# Patient Record
Sex: FEMALE | Race: WHITE | NOT HISPANIC OR LATINO | Employment: UNEMPLOYED | URBAN - METROPOLITAN AREA
[De-identification: names, ages, dates, MRNs, and addresses within clinical notes are randomized per-mention and may not be internally consistent; named-entity substitution may affect disease eponyms.]

---

## 2022-10-31 ENCOUNTER — OFFICE VISIT (OUTPATIENT)
Dept: INTERNAL MEDICINE | Facility: CLINIC | Age: 22
End: 2022-10-31
Payer: MEDICAID

## 2022-10-31 VITALS
HEART RATE: 97 BPM | DIASTOLIC BLOOD PRESSURE: 72 MMHG | HEIGHT: 63 IN | RESPIRATION RATE: 20 BRPM | TEMPERATURE: 99 F | BODY MASS INDEX: 29.41 KG/M2 | OXYGEN SATURATION: 97 % | WEIGHT: 166 LBS | SYSTOLIC BLOOD PRESSURE: 126 MMHG

## 2022-10-31 DIAGNOSIS — J02.9 SORE THROAT: Primary | ICD-10-CM

## 2022-10-31 DIAGNOSIS — M41.9 SCOLIOSIS, UNSPECIFIED SCOLIOSIS TYPE, UNSPECIFIED SPINAL REGION: ICD-10-CM

## 2022-10-31 DIAGNOSIS — Z12.4 CERVICAL CANCER SCREENING: ICD-10-CM

## 2022-10-31 DIAGNOSIS — Z00.00 WELLNESS EXAMINATION: ICD-10-CM

## 2022-10-31 PROCEDURE — 3008F BODY MASS INDEX DOCD: CPT | Mod: CPTII,,, | Performed by: STUDENT IN AN ORGANIZED HEALTH CARE EDUCATION/TRAINING PROGRAM

## 2022-10-31 PROCEDURE — 1159F MED LIST DOCD IN RCRD: CPT | Mod: CPTII,,, | Performed by: STUDENT IN AN ORGANIZED HEALTH CARE EDUCATION/TRAINING PROGRAM

## 2022-10-31 PROCEDURE — 1159F PR MEDICATION LIST DOCUMENTED IN MEDICAL RECORD: ICD-10-PCS | Mod: CPTII,,, | Performed by: STUDENT IN AN ORGANIZED HEALTH CARE EDUCATION/TRAINING PROGRAM

## 2022-10-31 PROCEDURE — 1160F RVW MEDS BY RX/DR IN RCRD: CPT | Mod: CPTII,,, | Performed by: STUDENT IN AN ORGANIZED HEALTH CARE EDUCATION/TRAINING PROGRAM

## 2022-10-31 PROCEDURE — 99204 OFFICE O/P NEW MOD 45 MIN: CPT | Mod: ,,, | Performed by: STUDENT IN AN ORGANIZED HEALTH CARE EDUCATION/TRAINING PROGRAM

## 2022-10-31 PROCEDURE — 1160F PR REVIEW ALL MEDS BY PRESCRIBER/CLIN PHARMACIST DOCUMENTED: ICD-10-PCS | Mod: CPTII,,, | Performed by: STUDENT IN AN ORGANIZED HEALTH CARE EDUCATION/TRAINING PROGRAM

## 2022-10-31 PROCEDURE — 3078F PR MOST RECENT DIASTOLIC BLOOD PRESSURE < 80 MM HG: ICD-10-PCS | Mod: CPTII,,, | Performed by: STUDENT IN AN ORGANIZED HEALTH CARE EDUCATION/TRAINING PROGRAM

## 2022-10-31 PROCEDURE — 3074F SYST BP LT 130 MM HG: CPT | Mod: CPTII,,, | Performed by: STUDENT IN AN ORGANIZED HEALTH CARE EDUCATION/TRAINING PROGRAM

## 2022-10-31 PROCEDURE — 3008F PR BODY MASS INDEX (BMI) DOCUMENTED: ICD-10-PCS | Mod: CPTII,,, | Performed by: STUDENT IN AN ORGANIZED HEALTH CARE EDUCATION/TRAINING PROGRAM

## 2022-10-31 PROCEDURE — 3078F DIAST BP <80 MM HG: CPT | Mod: CPTII,,, | Performed by: STUDENT IN AN ORGANIZED HEALTH CARE EDUCATION/TRAINING PROGRAM

## 2022-10-31 PROCEDURE — 3074F PR MOST RECENT SYSTOLIC BLOOD PRESSURE < 130 MM HG: ICD-10-PCS | Mod: CPTII,,, | Performed by: STUDENT IN AN ORGANIZED HEALTH CARE EDUCATION/TRAINING PROGRAM

## 2022-10-31 PROCEDURE — 99204 PR OFFICE/OUTPT VISIT, NEW, LEVL IV, 45-59 MIN: ICD-10-PCS | Mod: ,,, | Performed by: STUDENT IN AN ORGANIZED HEALTH CARE EDUCATION/TRAINING PROGRAM

## 2022-11-01 PROBLEM — J02.9 SORE THROAT: Status: ACTIVE | Noted: 2022-11-01

## 2022-11-01 PROBLEM — Z00.00 WELLNESS EXAMINATION: Status: ACTIVE | Noted: 2022-11-01

## 2022-11-01 NOTE — PROGRESS NOTES
Subjective:      Dalia Meade  11/01/2022  [id number]      Chief Complaint: Establish Care (Pt presents to clinic to establish care )       HPI:  Ms Gómez is a 21 y/o female patient who is here to establish care. Patient has no significant past medical history. Patient is complaining of sore throat, myalgias and headache that started on Friday. Patient has had sick contacts with flu. No other complaints.     Past Medical History:   Diagnosis Date    Anxiety disorder, unspecified     Scoliosis      Past Surgical History:   Procedure Laterality Date    BACK SURGERY       Family History   Problem Relation Age of Onset    Cancer Mother      Social History     Tobacco Use    Smoking status: Every Day     Types: Cigarettes, Vaping with nicotine     Passive exposure: Never    Smokeless tobacco: Never   Substance and Sexual Activity    Alcohol use: Never    Drug use: Never    Sexual activity: Never     Review of patient's allergies indicates:  No Known Allergies    The following were reviewed at this visit: active problem list, medication list, allergies, family history, social history, and health maintenance.    Medications:  No current outpatient medications on file.      Medications have been reviewed and reconciled with patient at this visit.  Barriers to medications reviewed with patient.    Adverse reactions to current medications reviewed with patient..    Over the counter medications reviewed and reconciled with patient.  Review of Systems   Constitutional:  Negative for chills, diaphoresis, fever and weight loss.   HENT:  Positive for sore throat. Negative for congestion, ear discharge and sinus pain.    Eyes:  Negative for photophobia.   Respiratory:  Negative for cough, hemoptysis and shortness of breath.    Cardiovascular:  Negative for chest pain, palpitations, claudication, leg swelling and PND.   Gastrointestinal:  Negative for constipation, diarrhea, nausea and vomiting.   Genitourinary:  Negative for  "dysuria, frequency and urgency.   Musculoskeletal:  Negative for back pain, joint pain, myalgias and neck pain.   Skin:  Negative for itching and rash.   Neurological:  Negative for dizziness, focal weakness and headaches.   Psychiatric/Behavioral:  Negative for depression. The patient does not have insomnia.          Objective:      Vitals:    10/31/22 1333   BP: 126/72   BP Location: Left arm   Patient Position: Sitting   BP Method: Medium (Automatic)   Pulse: 97   Resp: 20   Temp: 98.7 °F (37.1 °C)   TempSrc: Oral   SpO2: 97%   Weight: 75.3 kg (166 lb)   Height: 5' 3" (1.6 m)       Physical Exam  Constitutional:       General: She is not in acute distress.     Appearance: Normal appearance.   HENT:      Head: Normocephalic and atraumatic.      Mouth/Throat:      Pharynx: Posterior oropharyngeal erythema present.      Tonsils: Tonsillar exudate present.   Eyes:      Extraocular Movements: Extraocular movements intact.      Pupils: Pupils are equal, round, and reactive to light.   Cardiovascular:      Rate and Rhythm: Normal rate and regular rhythm.      Pulses: Normal pulses.      Heart sounds: Normal heart sounds. No murmur heard.    No friction rub. No gallop.   Pulmonary:      Effort: Pulmonary effort is normal.      Breath sounds: Normal breath sounds. No wheezing, rhonchi or rales.   Abdominal:      General: Abdomen is flat. Bowel sounds are normal. There is no distension.      Palpations: Abdomen is soft.      Tenderness: There is no abdominal tenderness.   Musculoskeletal:         General: No swelling or tenderness. Normal range of motion.      Cervical back: Normal range of motion and neck supple. No tenderness.      Right lower leg: No edema.      Left lower leg: No edema.   Lymphadenopathy:      Cervical: No cervical adenopathy.   Skin:     Findings: No lesion or rash.   Neurological:      General: No focal deficit present.      Mental Status: She is alert and oriented to person, place, and time.      " Cranial Nerves: No cranial nerve deficit.      Sensory: No sensory deficit.      Motor: No weakness.   Psychiatric:         Mood and Affect: Mood normal.         Behavior: Behavior normal.         Thought Content: Thought content normal.             Assessment and Plan:       1. Sore throat  Assessment & Plan:  Exudates present on physical exam  Rapid strep negative in clinic  Advised patient to get tested for flu at walk in clinic or pharmacy, these are not available in office       2. Wellness examination  Assessment & Plan:  Pap smear: due, will refer to GYN  Labs: due, ordered today      Orders:  -     CBC Auto Differential; Future; Expected date: 10/31/2022  -     Comprehensive Metabolic Panel; Future; Expected date: 10/31/2022  -     Lipid Panel; Future; Expected date: 10/31/2022    3. Cervical cancer screening  -     Ambulatory referral/consult to Gynecology; Future; Expected date: 11/07/2022    4. Scoliosis, unspecified scoliosis type, unspecified spinal region  -     Ambulatory referral/consult to Orthopedics; Future; Expected date: 11/07/2022          Follow up: Follow up in about 1 year (around 10/31/2023) for wellness, Labs check.

## 2022-11-01 NOTE — ASSESSMENT & PLAN NOTE
Exudates present on physical exam  Rapid strep negative in clinic  Advised patient to get tested for flu at walk in clinic or pharmacy, these are not available in office

## 2023-02-06 PROBLEM — Z00.00 WELLNESS EXAMINATION: Status: RESOLVED | Noted: 2022-11-01 | Resolved: 2023-02-06

## 2023-05-11 ENCOUNTER — TELEPHONE (OUTPATIENT)
Dept: INTERNAL MEDICINE | Facility: CLINIC | Age: 23
End: 2023-05-11
Payer: MEDICAID

## 2023-05-11 NOTE — TELEPHONE ENCOUNTER
Spoke with the pt, inform the pt that lab work can be done when she come in for her wellness. Pt has verbalized understanding.

## 2023-05-11 NOTE — TELEPHONE ENCOUNTER
----- Message from Cammy Victoria sent at 5/11/2023  1:42 PM CDT -----      .Type:  Patient Returning Call    Who Called:pt   Who Left Message for Patient:Shashank   Does the patient know what this is regarding?:message   Would the patient rather a call back or a response via [facility]? Call back   Best Call Back Number:[phone]  Additional Information: returning call please call back

## 2023-05-11 NOTE — TELEPHONE ENCOUNTER
----- Message from Cammy Victoria sent at 5/11/2023 11:31 AM CDT -----  .Type:  Needs Medical Advice    Who Called: pt     Would the patient rather a call back or a response via [facility]? Call back   Best Call Back Number: [phone]  Additional Information: pt is requesting lab orders

## 2023-05-11 NOTE — TELEPHONE ENCOUNTER
Was unsuccessful with reaching the pt, but I was abke to leave an vm for pt to return call to the office.

## 2023-06-24 ENCOUNTER — OFFICE VISIT (OUTPATIENT)
Dept: URGENT CARE | Facility: CLINIC | Age: 23
End: 2023-06-24
Payer: MEDICAID

## 2023-06-24 VITALS
OXYGEN SATURATION: 100 % | BODY MASS INDEX: 25.88 KG/M2 | DIASTOLIC BLOOD PRESSURE: 86 MMHG | HEART RATE: 84 BPM | RESPIRATION RATE: 22 BRPM | SYSTOLIC BLOOD PRESSURE: 126 MMHG | TEMPERATURE: 99 F | WEIGHT: 146.06 LBS | HEIGHT: 63 IN

## 2023-06-24 DIAGNOSIS — J02.0 STREP PHARYNGITIS: Primary | ICD-10-CM

## 2023-06-24 DIAGNOSIS — Z76.89 REFERRAL OF PATIENT: ICD-10-CM

## 2023-06-24 DIAGNOSIS — N30.90 CYSTITIS: ICD-10-CM

## 2023-06-24 DIAGNOSIS — F41.9 ANXIETY: ICD-10-CM

## 2023-06-24 LAB
BILIRUB UR QL STRIP: NEGATIVE
CTP QC/QA: YES
CTP QC/QA: YES
GLUCOSE UR QL STRIP: NEGATIVE
KETONES UR QL STRIP: POSITIVE
LEUKOCYTE ESTERASE UR QL STRIP: POSITIVE
MOLECULAR STREP A: POSITIVE
PH, POC UA: 5.5
POC BLOOD, URINE: POSITIVE
POC NITRATES, URINE: NEGATIVE
PROT UR QL STRIP: POSITIVE
SARS-COV-2 RDRP RESP QL NAA+PROBE: NEGATIVE
SP GR UR STRIP: >=1.03 (ref 1–1.03)
UROBILINOGEN UR STRIP-ACNC: 0.2 (ref 0.1–1.1)

## 2023-06-24 PROCEDURE — 99214 OFFICE O/P EST MOD 30 MIN: CPT | Mod: S$PBB,,,

## 2023-06-24 PROCEDURE — 99214 PR OFFICE/OUTPT VISIT, EST, LEVL IV, 30-39 MIN: ICD-10-PCS | Mod: S$PBB,,,

## 2023-06-24 PROCEDURE — 87088 URINE BACTERIA CULTURE: CPT

## 2023-06-24 PROCEDURE — 81003 URINALYSIS AUTO W/O SCOPE: CPT | Mod: PBBFAC

## 2023-06-24 PROCEDURE — 87651 STREP A DNA AMP PROBE: CPT | Mod: PBBFAC

## 2023-06-24 PROCEDURE — 99215 OFFICE O/P EST HI 40 MIN: CPT | Mod: PBBFAC

## 2023-06-24 PROCEDURE — 87635 SARS-COV-2 COVID-19 AMP PRB: CPT | Mod: PBBFAC

## 2023-06-24 RX ORDER — CEPHALEXIN 500 MG/1
500 CAPSULE ORAL 2 TIMES DAILY
Qty: 20 CAPSULE | Refills: 0 | Status: SHIPPED | OUTPATIENT
Start: 2023-06-24 | End: 2023-07-04

## 2023-06-24 RX ORDER — HYDROXYZINE HYDROCHLORIDE 50 MG/1
50 TABLET, FILM COATED ORAL 3 TIMES DAILY PRN
Qty: 21 TABLET | Refills: 0 | Status: SHIPPED | OUTPATIENT
Start: 2023-06-24 | End: 2023-07-01

## 2023-06-24 NOTE — PROGRESS NOTES
"Subjective:      Patient ID: Dalia Meade is a 22 y.o. female.    Vitals:  height is 5' 3" (1.6 m) and weight is 66.3 kg (146 lb 0.9 oz). Her oral temperature is 98.8 °F (37.1 °C). Her blood pressure is 126/86 and her pulse is 84. Her respiration is 22 (abnormal) and oxygen saturation is 100%.     Chief Complaint: Abdominal Pain, Headache, and Chest Heaviness (Symptoms started on yesterday. )    Cc as above. States she is stress due to work and school, so she does vape a lot. She was near her nephew and mom who are both sick.     Abdominal Pain  This is a new problem. The current episode started yesterday. The onset quality is sudden. The problem occurs intermittently. The problem has been unchanged. The pain is located in the periumbilical region. The pain is mild. The quality of the pain is cramping. The abdominal pain does not radiate. Associated symptoms include diarrhea and headaches. Pertinent negatives include no fever, nausea or vomiting. She has tried nothing for the symptoms.   Headache   This is a new problem. The current episode started yesterday. The problem occurs intermittently. The problem has been unchanged. The pain is located in the Frontal region. The pain radiates to the face. The quality of the pain is described as aching. The pain is mild. Associated symptoms include abdominal pain. Pertinent negatives include no coughing, fever, nausea, rhinorrhea, sore throat or vomiting. She has tried nothing for the symptoms.     Constitution: Negative for fever.   HENT:  Negative for sore throat.    Respiratory:  Negative for cough.    Gastrointestinal:  Positive for abdominal pain and diarrhea. Negative for nausea and vomiting.   Neurological:  Positive for headaches.    Objective:     Physical Exam   Constitutional: She is oriented to person, place, and time. She appears well-developed. She is cooperative.  Non-toxic appearance. normal  HENT:   Head: Normocephalic and atraumatic.   Ears:   Right Ear: " External ear and ear canal normal. A middle ear effusion is present.   Left Ear: External ear and ear canal normal. A middle ear effusion is present.   Nose: Nose normal. No rhinorrhea or congestion.      Comments: Mild nasal turbates  Mouth/Throat: Mucous membranes are normal. Mucous membranes are moist. Posterior oropharyngeal erythema present. Oropharynx is clear.      Comments: Mild erythema  Mild nasal turbates      Comments: Mild nasal turbates  Eyes: Conjunctivae and lids are normal. periorbital hyperpigmentation   Neck: Trachea normal. Neck supple.   Cardiovascular: Normal rate, regular rhythm, normal heart sounds and normal pulses.   Pulmonary/Chest: Effort normal and breath sounds normal. No respiratory distress.   Abdominal: Normal appearance and bowel sounds are normal. Soft. There is abdominal tenderness in the periumbilical area.   Musculoskeletal: Normal range of motion.         General: Normal range of motion.   Lymphadenopathy:     She has cervical adenopathy.   Neurological: no focal deficit. She is alert and oriented to person, place, and time. She has normal strength.   Skin: Skin is warm, dry, intact, not diaphoretic and not pale.   Psychiatric: Her speech is normal and behavior is normal. Mood, judgment and thought content normal.   Nursing note and vitals reviewed.    Assessment:     1. Strep pharyngitis    2. Cystitis    3. Anxiety    4. Referral of patient      Results for orders placed or performed in visit on 06/24/23   POCT COVID-19 Rapid Screening   Result Value Ref Range    POC Rapid COVID Negative Negative     Acceptable Yes    POCT Urinalysis, Dipstick, Automated, W/O Scope   Result Value Ref Range    POC Blood, Urine Positive (A) Negative    POC Bilirubin, Urine Negative Negative    POC Urobilinogen, Urine 0.2 0.1 - 1.1    POC Ketones, Urine Positive (A) Negative    POC Protein, Urine Positive (A) Negative    POC Nitrates, Urine Negative Negative    POC Glucose, Urine  Negative Negative    pH, UA 5.5     POC Specific Gravity, Urine >=1.030 1.003 - 1.029    POC Leukocytes, Urine Positive (A) Negative   POCT Strep A, Molecular   Result Value Ref Range    Molecular Strep A, POC Positive (A) Negative     Acceptable Yes         Plan:       Strep pharyngitis  -     POCT COVID-19 Rapid Screening  -     POCT Urinalysis, Dipstick, Automated, W/O Scope  -     POCT Strep A, Molecular  -     cephALEXin (KEFLEX) 500 MG capsule; Take 1 capsule (500 mg total) by mouth 2 (two) times a day. for 10 days  Dispense: 20 capsule; Refill: 0    Cystitis  -     POCT Urinalysis, Dipstick, Automated, W/O Scope  -     Urine culture  -     cephALEXin (KEFLEX) 500 MG capsule; Take 1 capsule (500 mg total) by mouth 2 (two) times a day. for 10 days  Dispense: 20 capsule; Refill: 0    Anxiety  -     hydrOXYzine (ATARAX) 50 MG tablet; Take 1 tablet (50 mg total) by mouth 3 (three) times daily as needed for Anxiety.  Dispense: 21 tablet; Refill: 0    Referral of patient  -     Ambulatory referral/consult to Psychiatry           Rest. Drink plenty of fluids. Take antibiotics as ordered. May take tylenol or ibuprofen as needed for discomfort.Out of school and/or work until you have taken 24 hours of antibiotics and are fever free for 24 hours.Change toothbrush on Day2. Strep IS CONTAGIOUS and is spread by spit. Do not share food, drinks, utensils, cosmetics, or saliva in any way until you are done with antibiotics. Go to the nearest ER for chest pain, shortness of breath, or palpations.

## 2023-06-24 NOTE — PATIENT INSTRUCTIONS
Rest. Drink plenty of fluids. Take antibiotics as ordered. May take tylenol or ibuprofen as needed for discomfort.Out of school and/or work until you have taken 24 hours of antibiotics and are fever free for 24 hours.Change toothbrush on Day2. Strep IS CONTAGIOUS and is spread by spit. Do not share food, drinks, utensils, cosmetics, or saliva in any way until you are done with antibiotics. Go to the nearest ER for chest pain, shortness of breath, or palpations.

## 2023-06-24 NOTE — LETTER
June 24, 2023      [facility]  [facility] [facility] [address]  Phone: [phone]       Patient: Dalia Meade   YOB: 2000  Date of Visit: 06/24/2023    To Whom It May Concern:    Kwaku Meade  was at [facility] on 06/24/2023. The patient may return to work/school on 06/26/2023 with no restrictions. If you have any questions or concerns, or if I can be of further assistance, please do not hesitate to contact me.    Sincerely,        Loan Barber NP

## 2023-06-26 LAB — BACTERIA UR CULT: NORMAL

## 2023-11-15 ENCOUNTER — TELEPHONE (OUTPATIENT)
Dept: INTERNAL MEDICINE | Facility: CLINIC | Age: 23
End: 2023-11-15
Payer: MEDICAID

## 2023-11-15 DIAGNOSIS — Z13.6 ENCOUNTER FOR LIPID SCREENING FOR CARDIOVASCULAR DISEASE: ICD-10-CM

## 2023-11-15 DIAGNOSIS — Z13.220 ENCOUNTER FOR LIPID SCREENING FOR CARDIOVASCULAR DISEASE: ICD-10-CM

## 2023-11-15 DIAGNOSIS — Z00.00 WELL ADULT EXAM: Primary | ICD-10-CM

## 2023-11-16 ENCOUNTER — LAB VISIT (OUTPATIENT)
Dept: LAB | Facility: HOSPITAL | Age: 23
End: 2023-11-16
Attending: STUDENT IN AN ORGANIZED HEALTH CARE EDUCATION/TRAINING PROGRAM
Payer: MEDICAID

## 2023-11-16 DIAGNOSIS — Z00.00 WELL ADULT EXAM: ICD-10-CM

## 2023-11-16 DIAGNOSIS — Z13.6 ENCOUNTER FOR LIPID SCREENING FOR CARDIOVASCULAR DISEASE: ICD-10-CM

## 2023-11-16 DIAGNOSIS — Z13.220 ENCOUNTER FOR LIPID SCREENING FOR CARDIOVASCULAR DISEASE: ICD-10-CM

## 2023-11-16 LAB
ALBUMIN SERPL-MCNC: 4.2 G/DL (ref 3.5–5)
ALBUMIN/GLOB SERPL: 1.2 RATIO (ref 1.1–2)
ALP SERPL-CCNC: 41 UNIT/L (ref 40–150)
ALT SERPL-CCNC: 13 UNIT/L (ref 0–55)
AST SERPL-CCNC: 15 UNIT/L (ref 5–34)
BASOPHILS # BLD AUTO: 0.02 X10(3)/MCL
BASOPHILS NFR BLD AUTO: 0.2 %
BILIRUB SERPL-MCNC: 0.4 MG/DL
BUN SERPL-MCNC: 8.2 MG/DL (ref 7–18.7)
CALCIUM SERPL-MCNC: 9.7 MG/DL (ref 8.4–10.2)
CHLORIDE SERPL-SCNC: 108 MMOL/L (ref 98–107)
CO2 SERPL-SCNC: 26 MMOL/L (ref 22–29)
CREAT SERPL-MCNC: 0.71 MG/DL (ref 0.55–1.02)
EOSINOPHIL # BLD AUTO: 0.11 X10(3)/MCL (ref 0–0.9)
EOSINOPHIL NFR BLD AUTO: 1.4 %
ERYTHROCYTE [DISTWIDTH] IN BLOOD BY AUTOMATED COUNT: 12.4 % (ref 11.5–17)
GFR SERPLBLD CREATININE-BSD FMLA CKD-EPI: >60 MLS/MIN/1.73/M2
GLOBULIN SER-MCNC: 3.6 GM/DL (ref 2.4–3.5)
GLUCOSE SERPL-MCNC: 76 MG/DL (ref 74–100)
HCT VFR BLD AUTO: 39.8 % (ref 37–47)
HGB BLD-MCNC: 13.1 G/DL (ref 12–16)
IMM GRANULOCYTES # BLD AUTO: 0.06 X10(3)/MCL (ref 0–0.04)
IMM GRANULOCYTES NFR BLD AUTO: 0.7 %
LYMPHOCYTES # BLD AUTO: 3.15 X10(3)/MCL (ref 0.6–4.6)
LYMPHOCYTES NFR BLD AUTO: 39 %
MCH RBC QN AUTO: 29.2 PG (ref 27–31)
MCHC RBC AUTO-ENTMCNC: 32.9 G/DL (ref 33–36)
MCV RBC AUTO: 88.8 FL (ref 80–94)
MONOCYTES # BLD AUTO: 0.48 X10(3)/MCL (ref 0.1–1.3)
MONOCYTES NFR BLD AUTO: 5.9 %
NEUTROPHILS # BLD AUTO: 4.26 X10(3)/MCL (ref 2.1–9.2)
NEUTROPHILS NFR BLD AUTO: 52.8 %
NRBC BLD AUTO-RTO: 0 %
PLATELET # BLD AUTO: 276 X10(3)/MCL (ref 130–400)
PMV BLD AUTO: 9.8 FL (ref 7.4–10.4)
POTASSIUM SERPL-SCNC: 4.7 MMOL/L (ref 3.5–5.1)
PROT SERPL-MCNC: 7.8 GM/DL (ref 6.4–8.3)
RBC # BLD AUTO: 4.48 X10(6)/MCL (ref 4.2–5.4)
SODIUM SERPL-SCNC: 140 MMOL/L (ref 136–145)
WBC # SPEC AUTO: 8.08 X10(3)/MCL (ref 4.5–11.5)

## 2023-11-16 PROCEDURE — 85025 COMPLETE CBC W/AUTO DIFF WBC: CPT

## 2023-11-16 PROCEDURE — 80053 COMPREHEN METABOLIC PANEL: CPT

## 2023-11-16 PROCEDURE — 36415 COLL VENOUS BLD VENIPUNCTURE: CPT

## 2023-11-21 ENCOUNTER — OFFICE VISIT (OUTPATIENT)
Dept: INTERNAL MEDICINE | Facility: CLINIC | Age: 23
End: 2023-11-21
Payer: MEDICAID

## 2023-11-21 VITALS
WEIGHT: 150 LBS | OXYGEN SATURATION: 99 % | DIASTOLIC BLOOD PRESSURE: 60 MMHG | BODY MASS INDEX: 26.58 KG/M2 | HEART RATE: 80 BPM | SYSTOLIC BLOOD PRESSURE: 100 MMHG | HEIGHT: 63 IN

## 2023-11-21 DIAGNOSIS — N94.6 DYSMENORRHEA: ICD-10-CM

## 2023-11-21 DIAGNOSIS — Z00.00 WELLNESS EXAMINATION: Primary | ICD-10-CM

## 2023-11-21 PROCEDURE — 1160F PR REVIEW ALL MEDS BY PRESCRIBER/CLIN PHARMACIST DOCUMENTED: ICD-10-PCS | Mod: CPTII,,, | Performed by: STUDENT IN AN ORGANIZED HEALTH CARE EDUCATION/TRAINING PROGRAM

## 2023-11-21 PROCEDURE — 1159F MED LIST DOCD IN RCRD: CPT | Mod: CPTII,,, | Performed by: STUDENT IN AN ORGANIZED HEALTH CARE EDUCATION/TRAINING PROGRAM

## 2023-11-21 PROCEDURE — 1159F PR MEDICATION LIST DOCUMENTED IN MEDICAL RECORD: ICD-10-PCS | Mod: CPTII,,, | Performed by: STUDENT IN AN ORGANIZED HEALTH CARE EDUCATION/TRAINING PROGRAM

## 2023-11-21 PROCEDURE — 99395 PR PREVENTIVE VISIT,EST,18-39: ICD-10-PCS | Mod: ,,, | Performed by: STUDENT IN AN ORGANIZED HEALTH CARE EDUCATION/TRAINING PROGRAM

## 2023-11-21 PROCEDURE — 3074F PR MOST RECENT SYSTOLIC BLOOD PRESSURE < 130 MM HG: ICD-10-PCS | Mod: CPTII,,, | Performed by: STUDENT IN AN ORGANIZED HEALTH CARE EDUCATION/TRAINING PROGRAM

## 2023-11-21 PROCEDURE — 1160F RVW MEDS BY RX/DR IN RCRD: CPT | Mod: CPTII,,, | Performed by: STUDENT IN AN ORGANIZED HEALTH CARE EDUCATION/TRAINING PROGRAM

## 2023-11-21 PROCEDURE — 3074F SYST BP LT 130 MM HG: CPT | Mod: CPTII,,, | Performed by: STUDENT IN AN ORGANIZED HEALTH CARE EDUCATION/TRAINING PROGRAM

## 2023-11-21 PROCEDURE — 99395 PREV VISIT EST AGE 18-39: CPT | Mod: ,,, | Performed by: STUDENT IN AN ORGANIZED HEALTH CARE EDUCATION/TRAINING PROGRAM

## 2023-11-21 PROCEDURE — 3008F PR BODY MASS INDEX (BMI) DOCUMENTED: ICD-10-PCS | Mod: CPTII,,, | Performed by: STUDENT IN AN ORGANIZED HEALTH CARE EDUCATION/TRAINING PROGRAM

## 2023-11-21 PROCEDURE — 3078F DIAST BP <80 MM HG: CPT | Mod: CPTII,,, | Performed by: STUDENT IN AN ORGANIZED HEALTH CARE EDUCATION/TRAINING PROGRAM

## 2023-11-21 PROCEDURE — 3078F PR MOST RECENT DIASTOLIC BLOOD PRESSURE < 80 MM HG: ICD-10-PCS | Mod: CPTII,,, | Performed by: STUDENT IN AN ORGANIZED HEALTH CARE EDUCATION/TRAINING PROGRAM

## 2023-11-21 PROCEDURE — 3008F BODY MASS INDEX DOCD: CPT | Mod: CPTII,,, | Performed by: STUDENT IN AN ORGANIZED HEALTH CARE EDUCATION/TRAINING PROGRAM

## 2023-11-22 NOTE — PROGRESS NOTES
Subjective:      Dalia Meade  11/21/2023  [id number]      Chief Complaint: Annual Exam       HPI:  Ms Gómez presents for annual wellness exam. Patient states has been having worsening cramps with menstrual cycles.     Past Medical History:   Diagnosis Date    Anxiety disorder, unspecified     Scoliosis      Past Surgical History:   Procedure Laterality Date    BACK SURGERY       Family History   Problem Relation Age of Onset    Cancer Mother      Social History     Tobacco Use    Smoking status: Every Day     Types: Vaping with nicotine     Passive exposure: Never    Smokeless tobacco: Never   Substance and Sexual Activity    Alcohol use: Never    Drug use: Never    Sexual activity: Never     Review of patient's allergies indicates:  No Known Allergies    The following were reviewed at this visit: active problem list, medication list, allergies, family history, social history, and health maintenance.    Medications:  No current outpatient medications on file.      Medications have been reviewed and reconciled with patient at this visit.  Barriers to medications reviewed with patient.    Adverse reactions to current medications reviewed with patient..    Over the counter medications reviewed and reconciled with patient.  Review of Systems   Constitutional:  Negative for chills, fever, malaise/fatigue and weight loss.   HENT:  Negative for congestion, ear discharge, ear pain, hearing loss, sinus pain and sore throat.    Eyes:  Negative for photophobia, pain, discharge and redness.   Respiratory:  Negative for cough, shortness of breath and wheezing.    Cardiovascular:  Negative for chest pain, palpitations and leg swelling.   Gastrointestinal:  Negative for constipation, diarrhea, heartburn, nausea and vomiting.   Genitourinary:  Negative for dysuria, frequency and urgency.   Musculoskeletal:  Negative for falls, joint pain and myalgias.   Skin:  Negative for itching and rash.   Neurological:  Negative for dizziness,  "focal weakness, weakness and headaches.   Psychiatric/Behavioral:  Negative for depression and memory loss. The patient is not nervous/anxious and does not have insomnia.            Objective:      Vitals:    11/21/23 1047   BP: 100/60   Pulse: 80   SpO2: 99%   Weight: 68 kg (150 lb)   Height: 5' 3" (1.6 m)       Physical Exam  Constitutional:       General: She is not in acute distress.     Appearance: Normal appearance.   HENT:      Head: Normocephalic and atraumatic.   Eyes:      Extraocular Movements: Extraocular movements intact.      Pupils: Pupils are equal, round, and reactive to light.   Cardiovascular:      Rate and Rhythm: Normal rate and regular rhythm.      Pulses: Normal pulses.      Heart sounds: Normal heart sounds. No murmur heard.     No friction rub. No gallop.   Pulmonary:      Effort: Pulmonary effort is normal.      Breath sounds: Normal breath sounds. No wheezing, rhonchi or rales.   Abdominal:      General: Abdomen is flat. Bowel sounds are normal. There is no distension.      Palpations: Abdomen is soft.      Tenderness: There is no abdominal tenderness.   Musculoskeletal:         General: No swelling or tenderness. Normal range of motion.      Cervical back: Normal range of motion and neck supple. No tenderness.      Right lower leg: No edema.      Left lower leg: No edema.   Lymphadenopathy:      Cervical: No cervical adenopathy.   Skin:     Findings: No lesion or rash.   Neurological:      General: No focal deficit present.      Mental Status: She is alert and oriented to person, place, and time.      Cranial Nerves: No cranial nerve deficit.      Sensory: No sensory deficit.      Motor: No weakness.   Psychiatric:         Mood and Affect: Mood normal.         Behavior: Behavior normal.         Thought Content: Thought content normal.               Assessment and Plan:       1. Wellness examination  Assessment & Plan:  Pap smear: due, will refer to GYN  Labs: done and discussed with " patient in office     Orders:  -     Cancel: Ambulatory referral/consult to Gynecology; Future; Expected date: 11/28/2023  -     Ambulatory referral/consult to Gynecology; Future; Expected date: 11/21/2023    2. Dysmenorrhea  Assessment & Plan:  Will refer to GYN              Follow up: Follow up in about 1 year (around 11/21/2024) for wellness, Labs check.

## 2023-12-08 ENCOUNTER — PATIENT MESSAGE (OUTPATIENT)
Dept: ADMINISTRATIVE | Facility: HOSPITAL | Age: 23
End: 2023-12-08
Payer: MEDICAID

## 2024-02-05 ENCOUNTER — PATIENT MESSAGE (OUTPATIENT)
Dept: ADMINISTRATIVE | Facility: HOSPITAL | Age: 24
End: 2024-02-05
Payer: MEDICAID

## 2024-02-22 ENCOUNTER — TELEPHONE (OUTPATIENT)
Dept: ADMINISTRATIVE | Facility: HOSPITAL | Age: 24
End: 2024-02-22
Payer: MEDICAID

## 2024-02-22 DIAGNOSIS — Z01.00 ROUTINE EYE EXAM: Primary | ICD-10-CM

## 2024-02-26 PROBLEM — Z00.00 WELLNESS EXAMINATION: Status: RESOLVED | Noted: 2022-11-01 | Resolved: 2024-02-26

## 2024-09-05 ENCOUNTER — APPOINTMENT (OUTPATIENT)
Dept: LAB | Facility: HOSPITAL | Age: 24
End: 2024-09-05
Attending: NURSE PRACTITIONER
Payer: MEDICAID

## 2024-09-05 ENCOUNTER — OFFICE VISIT (OUTPATIENT)
Dept: INTERNAL MEDICINE | Facility: CLINIC | Age: 24
End: 2024-09-05
Payer: MEDICAID

## 2024-09-05 VITALS
TEMPERATURE: 99 F | WEIGHT: 160 LBS | BODY MASS INDEX: 28.35 KG/M2 | RESPIRATION RATE: 16 BRPM | SYSTOLIC BLOOD PRESSURE: 119 MMHG | HEIGHT: 63 IN | HEART RATE: 53 BPM | DIASTOLIC BLOOD PRESSURE: 77 MMHG

## 2024-09-05 DIAGNOSIS — H65.192 ACUTE EFFUSION OF LEFT EAR: ICD-10-CM

## 2024-09-05 DIAGNOSIS — Z11.3 SCREENING EXAMINATION FOR STD (SEXUALLY TRANSMITTED DISEASE): ICD-10-CM

## 2024-09-05 DIAGNOSIS — Z11.4 SCREENING FOR HIV (HUMAN IMMUNODEFICIENCY VIRUS): ICD-10-CM

## 2024-09-05 DIAGNOSIS — M41.119 JUVENILE IDIOPATHIC SCOLIOSIS, UNSPECIFIED SPINAL REGION: ICD-10-CM

## 2024-09-05 DIAGNOSIS — Z12.4 CERVICAL CANCER SCREENING: ICD-10-CM

## 2024-09-05 DIAGNOSIS — Z00.00 WELLNESS EXAMINATION: Primary | ICD-10-CM

## 2024-09-05 DIAGNOSIS — F32.89 OTHER DEPRESSION: ICD-10-CM

## 2024-09-05 DIAGNOSIS — F17.290 OTHER TOBACCO PRODUCT NICOTINE DEPENDENCE, UNCOMPLICATED: ICD-10-CM

## 2024-09-05 PROBLEM — F17.200 NICOTINE DEPENDENCE, UNCOMPLICATED: Status: ACTIVE | Noted: 2024-09-05

## 2024-09-05 PROBLEM — F32.A DEPRESSION: Status: ACTIVE | Noted: 2024-09-05

## 2024-09-05 PROCEDURE — 99215 OFFICE O/P EST HI 40 MIN: CPT | Mod: PBBFAC | Performed by: NURSE PRACTITIONER

## 2024-09-05 RX ORDER — LORATADINE 10 MG/1
10 TABLET ORAL DAILY
Qty: 90 TABLET | Refills: 4 | Status: SHIPPED | OUTPATIENT
Start: 2024-09-05 | End: 2025-11-29

## 2024-09-05 RX ORDER — PREDNISONE 20 MG/1
20 TABLET ORAL DAILY
Qty: 3 TABLET | Refills: 0 | Status: SHIPPED | OUTPATIENT
Start: 2024-09-05 | End: 2024-09-08

## 2024-09-05 NOTE — PROGRESS NOTES
"  MELISA Packer   [facility]  [address] [facility] [address]      PATIENT NAME: Dalia Meade  : 2000  DATE: 24  MRN: [id number]      Patient PCP Information       Provider PCP Type    MELISA Packer General            Reason for Visit / Chief Complaint: Establish Care       History of Present Illness / Problem Focused Workflow     Dalia Meade presents to the clinic with Establish Care     23 yo WF here today to establish care.     Cervical Cancer Screening: [facility] GYN Referral   Osteoporosis Screenin24 Vitamin D Level   STD Screenin24  Wellness Screenin24  Pt here today to establish care and for routine f/u appointment. She is agreeable to labs today for evaluation and review, will f/u next week for lab review via virtual.   Pt reports with a hx of scoliosis, reports surgery at 9 yoa for Scoliosis; reports when sitting at times will have a sharp, pinching pain in areas of back and then when laying on either side of her ribs will feel like something is "caving in" or that there is pressure in the rib area. No medications when this occurs. Agreeable to x-rays today for thoracic and lumbar spine, will f/u with results next week as well; discussion regarding referral for PT pending the results, pt is agreeable. Reports with left ear popping along with some motion dizziness, area noted some fluid present, will start loratadine and prednisone for 3 days. Pt also reports with a hx of depression, reports father passed away about 10 years ago and then other life occurrences in the last 10 years; reports it comes and goes, denies any SI/HI. Has never seen any specialist or been on medications. Agreeable to referral today to  and to [facility] for counseling. Will f/u as directed. Denies any other issues today.           Review of Systems     Review of Systems   Constitutional: Negative.    HENT: " "Negative.     Eyes: Negative.    Respiratory: Negative.     Cardiovascular: Negative.    Gastrointestinal: Negative.    Endocrine: Negative.    Genitourinary: Negative.    Musculoskeletal:  Positive for back pain.   Neurological: Negative.    Psychiatric/Behavioral: Negative.           Medications and Allergies     Medications  Current Outpatient Medications   Medication Instructions    loratadine (CLARITIN) 10 mg, Oral, Daily    predniSONE (DELTASONE) 20 mg, Oral, Daily         Allergies  Review of patient's allergies indicates:  No Known Allergies    Physical Examination     Visit Vitals  /77   Pulse (!) 53   Temp 98.6 °F (37 °C)   Resp 16   Ht 5' 3" (1.6 m)   Wt 72.6 kg (160 lb)   LMP 08/29/2024 (Exact Date)   BMI 28.34 kg/m²       Physical Exam  Vitals reviewed.   Constitutional:       Appearance: Normal appearance. She is normal weight.   HENT:      Head: Normocephalic.      Right Ear: Hearing normal.      Left Ear: Hearing normal. A middle ear effusion is present.   Cardiovascular:      Rate and Rhythm: Normal rate and regular rhythm.      Pulses: Normal pulses.      Heart sounds: Normal heart sounds.   Pulmonary:      Effort: Pulmonary effort is normal.      Breath sounds: Normal breath sounds.   Abdominal:      General: Abdomen is flat.      Palpations: Abdomen is soft.   Musculoskeletal:         General: Normal range of motion.      Cervical back: Normal range of motion.   Skin:     General: Skin is warm and dry.   Neurological:      Mental Status: She is alert and oriented to person, place, and time.   Psychiatric:         Mood and Affect: Mood normal.           Results     Lab Results   Component Value Date    WBC 8.08 11/16/2023    RBC 4.48 11/16/2023    HGB 13.1 11/16/2023    HCT 39.8 11/16/2023    MCV 88.8 11/16/2023    MCH 29.2 11/16/2023    MCHC 32.9 (L) 11/16/2023    RDW 12.4 11/16/2023     11/16/2023    MPV 9.8 11/16/2023     CMP  Sodium   Date Value Ref Range Status   11/16/2023 140 " "136 - 145 mmol/L Final     Potassium   Date Value Ref Range Status   11/16/2023 4.7 3.5 - 5.1 mmol/L Final     Chloride   Date Value Ref Range Status   11/16/2023 108 (H) 98 - 107 mmol/L Final     CO2   Date Value Ref Range Status   11/16/2023 26 22 - 29 mmol/L Final     Blood Urea Nitrogen   Date Value Ref Range Status   11/16/2023 8.2 7.0 - 18.7 mg/dL Final     Creatinine   Date Value Ref Range Status   11/16/2023 0.71 0.55 - 1.02 mg/dL Final     Calcium   Date Value Ref Range Status   11/16/2023 9.7 8.4 - 10.2 mg/dL Final     Albumin   Date Value Ref Range Status   11/16/2023 4.2 3.5 - 5.0 g/dL Final     Bilirubin Total   Date Value Ref Range Status   11/16/2023 0.4 <=1.5 mg/dL Final     ALP   Date Value Ref Range Status   11/16/2023 41 40 - 150 unit/L Final     AST   Date Value Ref Range Status   11/16/2023 15 5 - 34 unit/L Final     ALT   Date Value Ref Range Status   11/16/2023 13 0 - 55 unit/L Final     No results found for: "CHOL"  No results found for: "HDL"  No results found for: "TRIG"  No results found for: "VLDL"  No results found for: "LDL"  No results found for: "TSH"      Assessment        ICD-10-CM ICD-9-CM   1. Wellness examination  Z00.00 V70.0   2. Screening examination for STD (sexually transmitted disease)  Z11.3 V74.5   3. Screening for HIV (human immunodeficiency virus)  Z11.4 V73.89   4. Cervical cancer screening  Z12.4 V76.2   5. Other tobacco product nicotine dependence, uncomplicated  F17.290 305.1   6. Other depression  F32.89 311   7. Juvenile idiopathic scoliosis, unspecified spinal region  M41.119 737.30   8. Acute effusion of left ear  H65.192 381.00        Plan      Problem List Items Addressed This Visit          Neuro    Juvenile idiopathic scoliosis    Current Assessment & Plan     Lumbar and Thoracic X-rays today  F/U with results  PT referral pending results          Relevant Orders    X-Ray Thoracic Spine AP Lateral    X-Ray Lumbar Spine 2 Or 3 Views       Psychiatric    " Depression    Current Assessment & Plan     Stable  Referral to  and to Counseling  Read positive daily meditations, avoid negative media, set healthy boundaries.  Exercise daily, keep consistent sleep pattern, eat a healthy diet.  Establish good social support, make changes to reduce stress.  Do not drink alcohol or use illicit drugs.  Reports any symptoms of suicidal/homicidal ideations or self harm immediately, go to nearest emergency room.           Relevant Orders    Ambulatory referral/consult to Behavioral Health       ENT    Acute effusion of left ear    Current Assessment & Plan     Loratadine and Prednisone for 3 days  F/U PRN  OTC antihistamines as needed (i.e. Benadryl)  Increase PO Fluids  Avoid/limit triggers such as pollen, dust, molds, and pet dander.   Avoid smoke, strong chemicals, cleaning products, perfumes/colognes.           Relevant Medications    loratadine (CLARITIN) 10 mg tablet    predniSONE (DELTASONE) 20 MG tablet       Other    Wellness examination - Primary    Current Assessment & Plan     Pt wellness visit completed today with appropriate lab work.   HM Topics Reviewed / Updated  Immunizations Discussed  Dicussed Healthy Diet &   Encouraged to exercise 3 x weekly  Increase Water Intake  Eat more fruits and vegetables  Avoid soda & alcohol           Relevant Orders    T4, Free    TSH    Hemoglobin A1C    Comprehensive Metabolic Panel    Urinalysis, Reflex to Urine Culture    Lipid Panel    CBC Auto Differential    HIV 1/2 Ag/Ab (4th Gen)    Nicotine dependence, uncomplicated    Current Assessment & Plan     Dangers of cigarette smoking were reviewed with patient in detail. Patient was Counseled for 3-10 minutes. Nicotine replacement options were discussed. Nicotine replacement was discussed- not prescribed per patient's request          Other Visit Diagnoses       Screening examination for STD (sexually transmitted disease)        Relevant Orders    Chlamydia/GC, PCR    SYPHILIS  ANTIBODY (WITH REFLEX RPR)    Hepatitis Panel, Acute    Screening for HIV (human immunodeficiency virus)        Relevant Orders    HIV 1/2 Ag/Ab (4th Gen)    Cervical cancer screening        Relevant Orders    Ambulatory referral/consult to Gynecology            Future Appointments   Date Time Provider Department Center   9/11/2024 12:40 PM Kusum Solorio, MELISA [facility]   9/9/2025  1:00 PM Martha Connelly, DARRYL [facility] GYN [facility]        Follow up in about 6 days (around 9/11/2024) for Established Virtual - Wellness Lab Review.      Signature:     [facility]  [address] [facility] [address]    Date of encounter: 9/5/24

## 2024-09-05 NOTE — ASSESSMENT & PLAN NOTE
Stable  Referral to  and to Counseling  Read positive daily meditations, avoid negative media, set healthy boundaries.  Exercise daily, keep consistent sleep pattern, eat a healthy diet.  Establish good social support, make changes to reduce stress.  Do not drink alcohol or use illicit drugs.  Reports any symptoms of suicidal/homicidal ideations or self harm immediately, go to nearest emergency room.

## 2024-09-05 NOTE — ASSESSMENT & PLAN NOTE
Loratadine and Prednisone for 3 days  F/U PRN  OTC antihistamines as needed (i.e. Benadryl)  Increase PO Fluids  Avoid/limit triggers such as pollen, dust, molds, and pet dander.   Avoid smoke, strong chemicals, cleaning products, perfumes/colognes.

## 2024-09-13 ENCOUNTER — OFFICE VISIT (OUTPATIENT)
Dept: INTERNAL MEDICINE | Facility: CLINIC | Age: 24
End: 2024-09-13
Payer: MEDICAID

## 2024-09-13 ENCOUNTER — PATIENT MESSAGE (OUTPATIENT)
Dept: INTERNAL MEDICINE | Facility: CLINIC | Age: 24
End: 2024-09-13

## 2024-09-13 DIAGNOSIS — H65.192 ACUTE EFFUSION OF LEFT EAR: Primary | ICD-10-CM

## 2024-09-13 DIAGNOSIS — Z00.00 WELLNESS EXAMINATION: ICD-10-CM

## 2024-09-13 NOTE — ASSESSMENT & PLAN NOTE
Continue Loratadine and Prednisone for 3 days  F/U PRN  OTC antihistamines as needed (i.e. Benadryl)  Increase PO Fluids  Avoid/limit triggers such as pollen, dust, molds, and pet dander.   Avoid smoke, strong chemicals, cleaning products, perfumes/colognes.

## 2024-09-13 NOTE — PROGRESS NOTES
"  MELISA Packer   [facility]  [address]] [address]      PATIENT NAME: Dalia Meade  : 2000  DATE: 24  MRN: [id number]      Reason for Visit / Chief Complaint: Follow-up       History of Present Illness / Problem Focused Workflow     Dalia Meade presents to the clinic with Follow-up     25 yo WF here today to establish care.     Cervical Cancer Screening: [facility] GYN Referral   Osteoporosis Screenin24 Vitamin D Level   STD Screenin24  Wellness Screenin24  Pt here today to establish care and for routine f/u appointment. She is agreeable to labs today for evaluation and review, will f/u next week for lab review via virtual.   Pt reports with a hx of scoliosis, reports surgery at 9 yoa for Scoliosis; reports when sitting at times will have a sharp, pinching pain in areas of back and then when laying on either side of her ribs will feel like something is "caving in" or that there is pressure in the rib area. No medications when this occurs. Agreeable to x-rays today for thoracic and lumbar spine, will f/u with results next week as well; discussion regarding referral for PT pending the results, pt is agreeable. Reports with left ear popping along with some motion dizziness, area noted some fluid present, will start loratadine and prednisone for 3 days. Pt also reports with a hx of depression, reports father passed away about 10 years ago and then other life occurrences in the last 10 years; reports it comes and goes, denies any SI/HI. Has never seen any specialist or been on medications. Agreeable to referral today to  and to [facility] for counseling. Will f/u as directed. Denies any other issues today.     2024  Pt here today via virtual for lab review; labs reviewed and discussed with no questions or concerns at this time. Pt reports has started taking mediations as directed. " Will complete back x-rays at earliest convience and we will f/u with results when available. Denies any acute issues or concerns at this time. Will f/u in 1 year for wellness or prn.             Review of Systems     Review of Systems   Constitutional: Negative.    HENT: Negative.     Eyes: Negative.    Respiratory: Negative.     Cardiovascular: Negative.    Gastrointestinal: Negative.    Endocrine: Negative.    Genitourinary: Negative.    Neurological: Negative.    Psychiatric/Behavioral: Negative.           Medications and Allergies     Medications  Medication List with Changes/Refills   Current Medications    LORATADINE (CLARITIN) 10 MG TABLET    Take 1 tablet (10 mg total) by mouth once daily.         Allergies  Review of patient's allergies indicates:  No Known Allergies    Physical Examination   There were no vitals filed for this visit.  Physical Exam  HENT:      Right Ear: Hearing normal.      Left Ear: Hearing normal.   Neurological:      Mental Status: She is alert and oriented to person, place, and time.   Psychiatric:         Mood and Affect: Mood normal.           Results     Lab Results   Component Value Date    WBC 9.04 09/05/2024    RBC 4.51 09/05/2024    HGB 13.8 09/05/2024    HCT 39.4 09/05/2024    MCV 87.4 09/05/2024    MCH 30.6 09/05/2024    MCHC 35.0 09/05/2024    RDW 12.2 09/05/2024     09/05/2024    MPV 10.1 09/05/2024     Sodium   Date Value Ref Range Status   09/05/2024 139 136 - 145 mmol/L Final     Potassium   Date Value Ref Range Status   09/05/2024 4.1 3.5 - 5.1 mmol/L Final     Chloride   Date Value Ref Range Status   09/05/2024 109 (H) 98 - 107 mmol/L Final     CO2   Date Value Ref Range Status   09/05/2024 23 22 - 29 mmol/L Final     Blood Urea Nitrogen   Date Value Ref Range Status   09/05/2024 12.3 7.0 - 18.7 mg/dL Final     Creatinine   Date Value Ref Range Status   09/05/2024 0.78 0.55 - 1.02 mg/dL Final     Calcium   Date Value Ref Range Status   09/05/2024 9.5 8.4 - 10.2  mg/dL Final     Albumin   Date Value Ref Range Status   09/05/2024 4.3 3.5 - 5.0 g/dL Final     Bilirubin Total   Date Value Ref Range Status   09/05/2024 0.3 <=1.5 mg/dL Final     ALP   Date Value Ref Range Status   09/05/2024 48 40 - 150 unit/L Final     AST   Date Value Ref Range Status   09/05/2024 13 5 - 34 unit/L Final     ALT   Date Value Ref Range Status   09/05/2024 7 0 - 55 unit/L Final     Lab Results   Component Value Date    CHOL 161 09/05/2024     Lab Results   Component Value Date    HDL 61 (H) 09/05/2024     Lab Results   Component Value Date    TRIG 53 09/05/2024     Lab Results   Component Value Date    VLDL 11 09/05/2024     Lab Results   Component Value Date    LDL 89.00 09/05/2024     Lab Results   Component Value Date    TSH 0.863 09/05/2024     Lab Results   Component Value Date    PHUR 6.0 09/05/2024    PROTEINUA Negative 09/05/2024    GLUCUA Normal 09/05/2024    OCCULTUA 1+ (A) 09/05/2024    NITRITE Negative 09/05/2024    LEUKOCYTESUR 500 (A) 09/05/2024     Lab Results   Component Value Date    HGBA1C 4.8 09/05/2024           Assessment         ICD-10-CM ICD-9-CM   1. Acute effusion of left ear  H65.192 381.00   2. Wellness examination  Z00.00 V70.0       Plan      Problem List Items Addressed This Visit          ENT    Acute effusion of left ear - Primary    Current Assessment & Plan     Continue Loratadine and Prednisone for 3 days  F/U PRN  OTC antihistamines as needed (i.e. Benadryl)  Increase PO Fluids  Avoid/limit triggers such as pollen, dust, molds, and pet dander.   Avoid smoke, strong chemicals, cleaning products, perfumes/colognes.              Other    Wellness examination    Relevant Orders    Hemoglobin A1C    Urinalysis, Reflex to Urine Culture    T4, Free    TSH    Lipid Panel    Comprehensive Metabolic Panel    CBC Auto Differential       Future Appointments   Date Time Provider Department Center   10/16/2024  1:00 PM Martha Connelly, CHETNAP [facility] GYN [facility]    10/22/2024  1:00 PM Jazmyn Kauffman, HOMERO [facility]            Signature:     [facility]  [address] [address]    Date of encounter: 9/13/24    The patient location is: home  The chief complaint leading to consultation is: lab review    Visit type: audiovisual    Face to Face time with patient: 15  20 minutes of total time spent on the encounter, which includes face to face time and non-face to face time preparing to see the patient (eg, review of tests), Obtaining and/or reviewing separately obtained history, Documenting clinical information in the electronic or other health record, Independently interpreting results (not separately reported) and communicating results to the patient/family/caregiver, or Care coordination (not separately reported).         Each patient to whom he or she provides medical services by telemedicine is:  (1) informed of the relationship between the physician and patient and the respective role of any other health care provider with respect to management of the patient; and (2) notified that he or she may decline to receive medical services by telemedicine and may withdraw from such care at any time.    Notes: